# Patient Record
Sex: MALE | Employment: FULL TIME | URBAN - METROPOLITAN AREA
[De-identification: names, ages, dates, MRNs, and addresses within clinical notes are randomized per-mention and may not be internally consistent; named-entity substitution may affect disease eponyms.]

---

## 2023-05-31 ENCOUNTER — OFFICE VISIT (OUTPATIENT)
Dept: URGENT CARE | Facility: PHYSICIAN GROUP | Age: 33
End: 2023-05-31
Payer: COMMERCIAL

## 2023-05-31 ENCOUNTER — HOSPITAL ENCOUNTER (OUTPATIENT)
Facility: MEDICAL CENTER | Age: 33
End: 2023-05-31
Payer: COMMERCIAL

## 2023-05-31 VITALS
WEIGHT: 140 LBS | TEMPERATURE: 98.4 F | OXYGEN SATURATION: 100 % | HEIGHT: 72 IN | HEART RATE: 63 BPM | BODY MASS INDEX: 18.96 KG/M2 | SYSTOLIC BLOOD PRESSURE: 112 MMHG | RESPIRATION RATE: 16 BRPM | DIASTOLIC BLOOD PRESSURE: 78 MMHG

## 2023-05-31 DIAGNOSIS — J02.9 PHARYNGITIS, UNSPECIFIED ETIOLOGY: ICD-10-CM

## 2023-05-31 DIAGNOSIS — H92.02 OTALGIA OF LEFT EAR: ICD-10-CM

## 2023-05-31 DIAGNOSIS — K13.70 ORAL LESION: ICD-10-CM

## 2023-05-31 PROCEDURE — 3074F SYST BP LT 130 MM HG: CPT

## 2023-05-31 PROCEDURE — 99213 OFFICE O/P EST LOW 20 MIN: CPT

## 2023-05-31 PROCEDURE — 87070 CULTURE OTHR SPECIMN AEROBIC: CPT

## 2023-05-31 PROCEDURE — 3078F DIAST BP <80 MM HG: CPT

## 2023-05-31 RX ORDER — CIPROFLOXACIN AND DEXAMETHASONE 3; 1 MG/ML; MG/ML
4 SUSPENSION/ DROPS AURICULAR (OTIC) 2 TIMES DAILY
Qty: 2.8 ML | Refills: 0 | Status: SHIPPED | OUTPATIENT
Start: 2023-05-31 | End: 2023-06-07

## 2023-05-31 RX ORDER — CIPROFLOXACIN AND DEXAMETHASONE 3; 1 MG/ML; MG/ML
4 SUSPENSION/ DROPS AURICULAR (OTIC) 2 TIMES DAILY
Qty: 2.8 ML | Refills: 0 | Status: SHIPPED | OUTPATIENT
Start: 2023-05-31 | End: 2023-05-31

## 2023-05-31 ASSESSMENT — ENCOUNTER SYMPTOMS
DIARRHEA: 0
NECK PAIN: 1
FEVER: 0
SHORTNESS OF BREATH: 0
COUGH: 0
CHILLS: 0
VOMITING: 0
HEADACHES: 0
SORE THROAT: 0
WHEEZING: 0
ABDOMINAL PAIN: 0

## 2023-06-01 NOTE — PATIENT INSTRUCTIONS
"As we discussed your clinical condition would benefit from over-the-counter remedies:    FLONASE (once per day)  You may consider intranasal steroids such as fluticasone (brand name Flonase), (other options include Nasonex, Rhinocort, Nasacort) daily; continue for at least 2-3 weeks. Any generic should work as well but may cause slightly more nasal irritation. Please take according to package directions.  This steroid will help reduce inflammation of your sinuses.  This is the number one medication to help with seasonal allergies and treat nasal inflammation.  Cost: around $8 at [facility] for the generic fluticasone [facility] product (as of 5/20).    ANTIHISTAMINES  You may take a non-sedating antihistamine like Zyrtec (cetirizine) , Allegra (fexofenadine), Xyzal (levocetirizine), or Claritin (loratadine).  This will help \"dry you out\" and reduce the amount of nasal inflammation.  Follow package directions paying attention to whether they are once or twice daily.  Note: if there is a \"D\" such leonela Allegra-D it also contains a decongestant such as sudafed.  Some patients benefit from alternating these medications every few weeks but literature does not support this.   Cost: around $11 at [facility] for the generic cetirizine [facility] branded product (as of 5/20)    SUDAFED (low dose to see if tolerated)  You may consider over-the-counter Sudafed (pseudoephedrine) to act as a decongestant to improve your breathing through your nose.  Please do not use this medication if you already have known high blood pressure.  Please take according to package directions and note that this has a stimulating effect and should not be taken late in the day.  There is a low dose 12 hour formulation and a higher dose 24 hour formulation.  Start with a low dose to make sure you tolerate the medication.  Do not take late in the day as it may interfere with sleep.   Cost: Around $6 for the generic [facility] branded product at a 10mg dose (as of " "2/2023).      BENZOCAINE DROPS  These contain the active ingredient benzocaine which is an anesthetic agent.  It helps relieve the symptoms of sore throat and may diminish your cough reflex.  Take according to package directions.  The brand name CEPACOL but the generic will suffice.  Please note that taking too frequently may cause harm - pay attention to package directions.  Cost: around $4 at [facility] for Chloroseptic drops (15 count) (as of 2/2023).      SALINE IRRIGATION/SPRAY  You may consider using a saline nasal irrigation (such as a \"Neti pot\" or similar device using sterile water, NOT tap water) or OTC saline nasal spray      NSAIDs  You may take Ibuprofen (brand name Motrin or Advil) may be taken 800 mg up to three times per day taken with food (do not exceed 2400 mg per day).  If you prefer you may consider Naproxen (brand name Naprosyn or Aleve) around 500 mg twice per day with food (do not exceed 1200 mg per day). Please do not take if you have known stomach ulcers or known kidney disease.     TYLENOL  You may take Tylenol according to package directions (1000 mg every 8 hours not to exceed 3000 mg per day).  Please do not consume with any alcohol products, or if you have known or suspected liver disease. These will help reduce inflammation, help with pain control, and can reduce fevers.     "

## 2023-06-01 NOTE — PROGRESS NOTES
Subjective:     Zeb Garcia is a 33 y.o. male who presents for Otalgia (X 3 days, L ear, sore throat from ear, headache)    The patient endorses that he traveled over the past over the weekend when he began experiencing left-sided otalgia.  He attempted to pop his ears, without resolution.  Over the past 3 days, the pain and ear fullness has worsened, which prompted him to seek treatment at the urgent care.There has been no fever. Associated symptoms include neck pain and mild left-sided pharyngitis. Pertinent negatives include no abdominal pain, coughing, diarrhea, ear discharge, headaches, difficulty swallowing, shortness of breath, hearing loss, rash, rhinorrhea, nausea or vomiting.  No known exposure to strep or mono.  He has tried NSAIDs for the symptoms. There is no history of a chronic ear infection, hearing loss or a tympanostomy tube. Personal history of ear infections as a child as well as tonsillar stones.    Review of Systems   Constitutional:  Negative for chills, fever and malaise/fatigue.   HENT:  Positive for ear pain. Negative for ear discharge, hearing loss and sore throat.    Respiratory:  Negative for cough, shortness of breath and wheezing.    Gastrointestinal:  Negative for abdominal pain, diarrhea and vomiting.   Musculoskeletal:  Positive for neck pain.   Skin:  Negative for rash.   Neurological:  Negative for headaches.   All other systems reviewed and are negative.      PMH: History reviewed. No pertinent past medical history.  ALLERGIES: No Known Allergies  SURGHX: History reviewed. No pertinent surgical history.  SOCHX:   Social History     Socioeconomic History    Marital status: Unknown   Tobacco Use    Smoking status: Never    Smokeless tobacco: Never     FH: History reviewed. No pertinent family history.      Objective:   /78   Pulse 63   Temp 36.9 °C (98.4 °F)   Resp 16   Ht 1.829 m (6')   Wt 63.5 kg (140 lb)   SpO2 100%   BMI 18.99 kg/m²     Physical Exam  Vitals  reviewed.   Constitutional:       General: He is not in acute distress.     Appearance: Normal appearance. He is not ill-appearing, toxic-appearing or diaphoretic.   HENT:      Head: Normocephalic and atraumatic.      Right Ear: Hearing, tympanic membrane, ear canal and external ear normal.      Left Ear: Hearing, ear canal and external ear normal. A middle ear effusion is present. No mastoid tenderness. Tympanic membrane is not injected or perforated.      Nose: Nose normal.      Mouth/Throat:      Mouth: Mucous membranes are moist.      Tongue: No lesions. Tongue does not deviate from midline.      Palate: Lesions present. No mass.      Pharynx: Oropharynx is clear. Uvula midline. No pharyngeal swelling, oropharyngeal exudate, posterior oropharyngeal erythema or uvula swelling.      Tonsils: No tonsillar exudate or tonsillar abscesses.        Comments: Visible lesion on the left lateral top palate.  No exudate present.  Eyes:      Extraocular Movements: Extraocular movements intact.      Conjunctiva/sclera: Conjunctivae normal.      Pupils: Pupils are equal, round, and reactive to light.   Cardiovascular:      Rate and Rhythm: Normal rate and regular rhythm.      Pulses: Normal pulses.      Heart sounds: Normal heart sounds.   Pulmonary:      Effort: Pulmonary effort is normal.      Breath sounds: Normal breath sounds.   Abdominal:      General: Abdomen is flat.      Palpations: Abdomen is soft.   Musculoskeletal:         General: Normal range of motion.      Cervical back: Normal range of motion and neck supple. No rigidity or tenderness.   Lymphadenopathy:      Cervical: No cervical adenopathy.   Skin:     General: Skin is warm and dry.   Neurological:      General: No focal deficit present.      Mental Status: He is alert and oriented to person, place, and time. Mental status is at baseline.   Psychiatric:         Mood and Affect: Mood normal.         Behavior: Behavior normal.         Thought Content: Thought  content normal.         Judgment: Judgment normal.       Throat culture in progress  Assessment/Plan:   Assessment      1. Pharyngitis, unspecified etiology  - CULTURE THROAT; Future  Low suspicion for strep pharyngitis as patient has no other symptoms consistent with a bacterial infection.  Symptomatic care discussed with patient.    2. Otalgia of left ear  - ciprofloxacin/dexamethasone (CIPRODEX) 0.3-0.1 % Suspension; Administer 4 Drops into affected ear(s) 2 times a day for 7 days.  Dispense: 2.8 mL; Refill: 0    3. Oral lesion  - CULTURE THROAT; Future   Will follow-up with patient regarding culture results patient encouraged to follow-up with primary care provider if there are any changes to the lesion, increased pain, difficulty swallowing    All questions answered. Patient verbalized understanding and is in agreement with this plan of care. If symptoms are worsening or not improving in 3-5 days, follow-up with PCP or return to UC. Differential diagnosis, natural history, and supportive care discussed. AVS handout given and reviewed with patient. Patient educated on red flags and when to seek treatment back in ED or UC.     I personally reviewed prior external notes and test results pertinent to today's visit.  I have independently reviewed and interpreted all diagnostics ordered during this urgent care visit.     This dictation has been created using voice recognition software. The accuracy of the dictation is limited by the abilities of the software. I expect there may be some errors of grammar and possibly content. I made every attempt to manually correct the errors within my dictation. However, errors related to voice recognition software may still exist and should be interpreted within the appropriate context.    This note was electronically signed by HASEEB Dumas

## 2023-06-04 LAB
BACTERIA SPEC RESP CULT: NORMAL
SIGNIFICANT IND 70042: NORMAL
SITE SITE: NORMAL
SOURCE SOURCE: NORMAL

## 2023-06-08 ENCOUNTER — OFFICE VISIT (OUTPATIENT)
Dept: URGENT CARE | Facility: PHYSICIAN GROUP | Age: 33
End: 2023-06-08
Payer: COMMERCIAL

## 2023-06-08 VITALS
HEIGHT: 71 IN | DIASTOLIC BLOOD PRESSURE: 74 MMHG | RESPIRATION RATE: 18 BRPM | WEIGHT: 140 LBS | SYSTOLIC BLOOD PRESSURE: 114 MMHG | HEART RATE: 73 BPM | TEMPERATURE: 98.6 F | BODY MASS INDEX: 19.6 KG/M2 | OXYGEN SATURATION: 95 %

## 2023-06-08 DIAGNOSIS — B96.89 ACUTE BACTERIAL SINUSITIS: ICD-10-CM

## 2023-06-08 DIAGNOSIS — R06.2 SYMPTOM OF WHEEZING: ICD-10-CM

## 2023-06-08 DIAGNOSIS — K13.70 ORAL LESION: ICD-10-CM

## 2023-06-08 DIAGNOSIS — H92.03 ACUTE EAR PAIN, BILATERAL: ICD-10-CM

## 2023-06-08 DIAGNOSIS — J01.90 ACUTE BACTERIAL SINUSITIS: ICD-10-CM

## 2023-06-08 DIAGNOSIS — R05.9 COUGH, UNSPECIFIED TYPE: ICD-10-CM

## 2023-06-08 PROCEDURE — 99214 OFFICE O/P EST MOD 30 MIN: CPT | Performed by: STUDENT IN AN ORGANIZED HEALTH CARE EDUCATION/TRAINING PROGRAM

## 2023-06-08 PROCEDURE — 3074F SYST BP LT 130 MM HG: CPT | Performed by: STUDENT IN AN ORGANIZED HEALTH CARE EDUCATION/TRAINING PROGRAM

## 2023-06-08 PROCEDURE — 3078F DIAST BP <80 MM HG: CPT | Performed by: STUDENT IN AN ORGANIZED HEALTH CARE EDUCATION/TRAINING PROGRAM

## 2023-06-08 RX ORDER — AMOXICILLIN AND CLAVULANATE POTASSIUM 875; 125 MG/1; MG/1
1 TABLET, FILM COATED ORAL 2 TIMES DAILY
Qty: 14 TABLET | Refills: 0 | Status: SHIPPED | OUTPATIENT
Start: 2023-06-08 | End: 2023-06-15

## 2023-06-08 RX ORDER — METHYLPREDNISOLONE 4 MG/1
TABLET ORAL
Qty: 21 TABLET | Refills: 0 | Status: SHIPPED | OUTPATIENT
Start: 2023-06-08

## 2023-06-08 RX ORDER — ALBUTEROL SULFATE 90 UG/1
2 AEROSOL, METERED RESPIRATORY (INHALATION) EVERY 6 HOURS PRN
Qty: 8.5 G | Refills: 0 | Status: SHIPPED | OUTPATIENT
Start: 2023-06-08

## 2023-06-08 ASSESSMENT — ENCOUNTER SYMPTOMS
VOMITING: 0
PALPITATIONS: 0
FEVER: 0
SINUS PAIN: 1
SHORTNESS OF BREATH: 0
ABDOMINAL PAIN: 0
SPUTUM PRODUCTION: 1
DIZZINESS: 0
COUGH: 1
SORE THROAT: 1
HEADACHES: 1
WHEEZING: 1
NAUSEA: 0
CONSTIPATION: 0
HEMOPTYSIS: 0
DIARRHEA: 0

## 2023-06-08 NOTE — PROGRESS NOTES
"Subjective     Zeb Garcia is a 33 y.o. male who presents with Otalgia (L ear pain is not improving, laryngitis, cough, nasal congestion, x 10 days )            Zeb is a 33 y.o. male who presents to urgent care with bilateral ear pain, sore throat, loss of voice, cough, nasal congestion and sinus pain/pressure.  Patient states onset of symptoms was approximately 10 days ago.  Reports being seen in urgent care approximately a week ago.  Patient states he started on antibiotic eardrops but she has not helped with ear pain.  Patient now is not only experiencing left ear pain but right ear pain as well.  Patient also reports productive cough.  Reports no shortness of breath.  He does have symptoms of wheezing and chest congestion.  He reports nasal congestion and sinus pain/pressure.  Patient has tried over-the-counter medications which have not helped with symptoms.  Patient does state that ibuprofen somewhat helps with ear pain and headache.  In        Review of Systems   Constitutional:  Positive for malaise/fatigue. Negative for fever.   HENT:  Positive for congestion, ear pain, sinus pain and sore throat.    Respiratory:  Positive for cough, sputum production and wheezing. Negative for hemoptysis and shortness of breath.    Cardiovascular:  Negative for chest pain and palpitations.   Gastrointestinal:  Negative for abdominal pain, constipation, diarrhea, nausea and vomiting.   Neurological:  Positive for headaches. Negative for dizziness.   All other systems reviewed and are negative.             Objective     /74   Pulse 73   Temp 37 °C (98.6 °F)   Resp 18   Ht 1.803 m (5' 11\")   Wt 63.5 kg (140 lb)   SpO2 95%   BMI 19.53 kg/m²      Physical Exam  Vitals reviewed.   Constitutional:       General: He is not in acute distress.     Appearance: Normal appearance. He is ill-appearing. He is not toxic-appearing or diaphoretic.   HENT:      Right Ear: Tympanic membrane, ear canal and external ear " normal.      Left Ear: Tympanic membrane, ear canal and external ear normal.      Nose: Congestion present.      Right Sinus: Maxillary sinus tenderness present.      Left Sinus: Maxillary sinus tenderness present.      Mouth/Throat:      Lips: Pink.      Mouth: Mucous membranes are moist.      Palate: Lesions (oval white lesion left lateral upper palate) present.      Pharynx: Oropharynx is clear. Uvula midline.   Eyes:      Extraocular Movements: Extraocular movements intact.      Conjunctiva/sclera: Conjunctivae normal.      Pupils: Pupils are equal, round, and reactive to light.   Cardiovascular:      Rate and Rhythm: Normal rate and regular rhythm.   Pulmonary:      Effort: Pulmonary effort is normal. No respiratory distress.      Breath sounds: Normal breath sounds. No stridor. No wheezing, rhonchi or rales.   Skin:     General: Skin is warm and dry.   Neurological:      General: No focal deficit present.      Mental Status: He is alert. Mental status is at baseline.                             Assessment & Plan          1. Acute bacterial sinusitis  - amoxicillin-clavulanate (AUGMENTIN) 875-125 MG Tab; Take 1 Tablet by mouth 2 times a day for 7 days.  Dispense: 14 Tablet; Refill: 0    2. Acute ear pain, bilateral  - methylPREDNISolone (MEDROL DOSEPAK) 4 MG Tablet Therapy Pack; Follow schedule on package instructions.  Dispense: 21 Tablet; Refill: 0    3. Cough, unspecified type  - methylPREDNISolone (MEDROL DOSEPAK) 4 MG Tablet Therapy Pack; Follow schedule on package instructions.  Dispense: 21 Tablet; Refill: 0    4. Symptom of wheezing  - methylPREDNISolone (MEDROL DOSEPAK) 4 MG Tablet Therapy Pack; Follow schedule on package instructions.  Dispense: 21 Tablet; Refill: 0  - albuterol 108 (90 Base) MCG/ACT Aero Soln inhalation aerosol; Inhale 2 Puffs every 6 hours as needed (Shortness of Breath and/or Wheezing).  Dispense: 8.5 g; Refill: 0    5. Oral lesion  - Continue to monitor. Follow up with PCP if no  improvement or resolution for further evaluation/management.     Patient seen in urgent care on 5/31/2023 for similar symptoms.  Patient was started on Ciprodex for left ear pain.  Throat culture was obtained and was negative.    Patient has continued to have no improvement of symptoms. Patient currently on Day #10/11 of symptoms.    Differential diagnoses, supportive care measures, and indications for immediate follow-up discussed with patient. Pathogenesis of diagnosis discussed including typical length and natural progression.      My total time spent caring for the patient on the day of the encounter was 30 minutes including reviewing patient's chart, prior external notes/test results, obtaining patient history, physical exam, discussing differential diagnosis, plan of care, supportive care measures, appropriate follow-up and indications for immediate follow-up. This does not include time spent on separately billable procedures/tests.     Instructed to return to urgent care or nearest emergency department if symptoms fail to improve, for any change in condition, further concerns, or new concerning symptoms.    Patient states understanding and agrees with the plan of care and discharge instructions.

## 2023-06-09 ENCOUNTER — HOSPITAL ENCOUNTER (OUTPATIENT)
Dept: RADIOLOGY | Facility: MEDICAL CENTER | Age: 33
End: 2023-06-09
Attending: ORTHOPAEDIC SURGERY
Payer: COMMERCIAL

## 2023-06-09 DIAGNOSIS — M25.511 RIGHT SHOULDER PAIN, UNSPECIFIED CHRONICITY: ICD-10-CM

## 2023-06-09 PROCEDURE — 73222 MRI JOINT UPR EXTREM W/DYE: CPT | Mod: RT

## 2023-06-09 PROCEDURE — 77002 NEEDLE LOCALIZATION BY XRAY: CPT | Mod: RT

## 2023-06-09 PROCEDURE — A9579 GAD-BASE MR CONTRAST NOS,1ML: HCPCS | Performed by: ORTHOPAEDIC SURGERY

## 2023-06-09 PROCEDURE — 700117 HCHG RX CONTRAST REV CODE 255: Performed by: ORTHOPAEDIC SURGERY

## 2023-06-09 RX ORDER — SODIUM BICARBONATE 42 MG/ML
INJECTION, SOLUTION INTRAVENOUS
Status: DISCONTINUED
Start: 2023-06-09 | End: 2023-06-10 | Stop reason: HOSPADM

## 2023-06-09 RX ORDER — LIDOCAINE HYDROCHLORIDE 10 MG/ML
INJECTION, SOLUTION INFILTRATION; PERINEURAL
Status: DISCONTINUED
Start: 2023-06-09 | End: 2023-06-10 | Stop reason: HOSPADM

## 2023-06-09 RX ADMIN — IOHEXOL 50 ML: 240 INJECTION, SOLUTION INTRATHECAL; INTRAVASCULAR; INTRAVENOUS; ORAL at 14:20

## 2023-06-09 RX ADMIN — GADOTERIDOL 0.1 ML: 279.3 INJECTION, SOLUTION INTRAVENOUS at 14:20
